# Patient Record
Sex: MALE | Race: NATIVE HAWAIIAN OR OTHER PACIFIC ISLANDER | NOT HISPANIC OR LATINO | URBAN - METROPOLITAN AREA
[De-identification: names, ages, dates, MRNs, and addresses within clinical notes are randomized per-mention and may not be internally consistent; named-entity substitution may affect disease eponyms.]

---

## 2017-08-01 ENCOUNTER — INPATIENT (INPATIENT)
Facility: HOSPITAL | Age: 72
LOS: 1 days | Discharge: HOME | DRG: 292 | End: 2017-08-03
Attending: INTERNAL MEDICINE | Admitting: INTERNAL MEDICINE
Payer: MEDICARE

## 2017-08-01 VITALS
OXYGEN SATURATION: 100 % | DIASTOLIC BLOOD PRESSURE: 70 MMHG | TEMPERATURE: 98 F | HEART RATE: 67 BPM | RESPIRATION RATE: 16 BRPM | SYSTOLIC BLOOD PRESSURE: 134 MMHG

## 2017-08-01 LAB
ALBUMIN SERPL ELPH-MCNC: 3.5 G/DL — SIGNIFICANT CHANGE UP (ref 3.3–5)
ALBUMIN SERPL ELPH-MCNC: 3.7 G/DL — SIGNIFICANT CHANGE UP (ref 3.3–5)
ALP SERPL-CCNC: 109 U/L — SIGNIFICANT CHANGE UP (ref 40–120)
ALP SERPL-CCNC: 120 U/L — SIGNIFICANT CHANGE UP (ref 40–120)
ALT FLD-CCNC: 33 U/L — SIGNIFICANT CHANGE UP (ref 10–45)
ALT FLD-CCNC: <5 U/L — LOW (ref 10–45)
ANION GAP SERPL CALC-SCNC: 11 MMOL/L — SIGNIFICANT CHANGE UP (ref 5–17)
ANION GAP SERPL CALC-SCNC: 12 MMOL/L — SIGNIFICANT CHANGE UP (ref 5–17)
APTT BLD: 32.8 SEC — SIGNIFICANT CHANGE UP (ref 27.5–37.4)
AST SERPL-CCNC: 34 U/L — SIGNIFICANT CHANGE UP (ref 10–40)
AST SERPL-CCNC: 62 U/L — HIGH (ref 10–40)
BASOPHILS NFR BLD AUTO: 0.7 % — SIGNIFICANT CHANGE UP (ref 0–2)
BILIRUB SERPL-MCNC: 0.2 MG/DL — SIGNIFICANT CHANGE UP (ref 0.2–1.2)
BILIRUB SERPL-MCNC: 0.3 MG/DL — SIGNIFICANT CHANGE UP (ref 0.2–1.2)
BUN SERPL-MCNC: 41 MG/DL — HIGH (ref 7–23)
BUN SERPL-MCNC: 42 MG/DL — HIGH (ref 7–23)
CALCIUM SERPL-MCNC: 8.5 MG/DL — SIGNIFICANT CHANGE UP (ref 8.4–10.5)
CALCIUM SERPL-MCNC: 8.7 MG/DL — SIGNIFICANT CHANGE UP (ref 8.4–10.5)
CHLORIDE SERPL-SCNC: 100 MMOL/L — SIGNIFICANT CHANGE UP (ref 96–108)
CHLORIDE SERPL-SCNC: 103 MMOL/L — SIGNIFICANT CHANGE UP (ref 96–108)
CK MB CFR SERPL CALC: 1.8 NG/ML — SIGNIFICANT CHANGE UP (ref 0–6.7)
CK SERPL-CCNC: 158 U/L — SIGNIFICANT CHANGE UP (ref 30–200)
CO2 SERPL-SCNC: 24 MMOL/L — SIGNIFICANT CHANGE UP (ref 22–31)
CO2 SERPL-SCNC: 24 MMOL/L — SIGNIFICANT CHANGE UP (ref 22–31)
CREAT SERPL-MCNC: 2.3 MG/DL — HIGH (ref 0.5–1.3)
CREAT SERPL-MCNC: 2.3 MG/DL — HIGH (ref 0.5–1.3)
EOSINOPHIL NFR BLD AUTO: 4.4 % — SIGNIFICANT CHANGE UP (ref 0–6)
GLUCOSE SERPL-MCNC: 134 MG/DL — HIGH (ref 70–99)
GLUCOSE SERPL-MCNC: 94 MG/DL — SIGNIFICANT CHANGE UP (ref 70–99)
HCT VFR BLD CALC: 33.5 % — LOW (ref 39–50)
HGB BLD-MCNC: 10.8 G/DL — LOW (ref 13–17)
INR BLD: 1.05 — SIGNIFICANT CHANGE UP (ref 0.88–1.16)
LYMPHOCYTES # BLD AUTO: 22.8 % — SIGNIFICANT CHANGE UP (ref 13–44)
MCHC RBC-ENTMCNC: 28.3 PG — SIGNIFICANT CHANGE UP (ref 27–34)
MCHC RBC-ENTMCNC: 32.2 G/DL — SIGNIFICANT CHANGE UP (ref 32–36)
MCV RBC AUTO: 87.9 FL — SIGNIFICANT CHANGE UP (ref 80–100)
MONOCYTES NFR BLD AUTO: 18.6 % — HIGH (ref 2–14)
NEUTROPHILS NFR BLD AUTO: 53.5 % — SIGNIFICANT CHANGE UP (ref 43–77)
PLATELET # BLD AUTO: 202 K/UL — SIGNIFICANT CHANGE UP (ref 150–400)
POTASSIUM SERPL-MCNC: 4.3 MMOL/L — SIGNIFICANT CHANGE UP (ref 3.5–5.3)
POTASSIUM SERPL-MCNC: SIGNIFICANT CHANGE UP MMOL/L (ref 3.5–5.3)
POTASSIUM SERPL-SCNC: 4.3 MMOL/L — SIGNIFICANT CHANGE UP (ref 3.5–5.3)
POTASSIUM SERPL-SCNC: SIGNIFICANT CHANGE UP MMOL/L (ref 3.5–5.3)
PROT SERPL-MCNC: 7.3 G/DL — SIGNIFICANT CHANGE UP (ref 6–8.3)
PROT SERPL-MCNC: 8.4 G/DL — HIGH (ref 6–8.3)
PROTHROM AB SERPL-ACNC: 11.7 SEC — SIGNIFICANT CHANGE UP (ref 9.8–12.7)
RBC # BLD: 3.81 M/UL — LOW (ref 4.2–5.8)
RBC # FLD: 15.8 % — SIGNIFICANT CHANGE UP (ref 10.3–16.9)
SODIUM SERPL-SCNC: 135 MMOL/L — SIGNIFICANT CHANGE UP (ref 135–145)
SODIUM SERPL-SCNC: 139 MMOL/L — SIGNIFICANT CHANGE UP (ref 135–145)
TROPONIN T SERPL-MCNC: <0.01 NG/ML — SIGNIFICANT CHANGE UP (ref 0–0.01)
WBC # BLD: 4.6 K/UL — SIGNIFICANT CHANGE UP (ref 3.8–10.5)
WBC # FLD AUTO: 4.6 K/UL — SIGNIFICANT CHANGE UP (ref 3.8–10.5)

## 2017-08-01 PROCEDURE — 71010: CPT | Mod: 26

## 2017-08-01 PROCEDURE — 99285 EMERGENCY DEPT VISIT HI MDM: CPT

## 2017-08-01 RX ORDER — FUROSEMIDE 40 MG
40 TABLET ORAL DAILY
Qty: 0 | Refills: 0 | Status: DISCONTINUED | OUTPATIENT
Start: 2017-08-01 | End: 2017-08-03

## 2017-08-01 RX ORDER — ISOSORBIDE DINITRATE 5 MG/1
20 TABLET ORAL EVERY 8 HOURS
Qty: 0 | Refills: 0 | Status: DISCONTINUED | OUTPATIENT
Start: 2017-08-01 | End: 2017-08-03

## 2017-08-01 RX ORDER — CLOPIDOGREL BISULFATE 75 MG/1
600 TABLET, FILM COATED ORAL ONCE
Qty: 0 | Refills: 0 | Status: COMPLETED | OUTPATIENT
Start: 2017-08-01 | End: 2017-08-01

## 2017-08-01 RX ORDER — ATORVASTATIN CALCIUM 80 MG/1
80 TABLET, FILM COATED ORAL AT BEDTIME
Qty: 0 | Refills: 0 | Status: DISCONTINUED | OUTPATIENT
Start: 2017-08-01 | End: 2017-08-03

## 2017-08-01 RX ORDER — HYDRALAZINE HCL 50 MG
20 TABLET ORAL EVERY 8 HOURS
Qty: 0 | Refills: 0 | Status: DISCONTINUED | OUTPATIENT
Start: 2017-08-01 | End: 2017-08-03

## 2017-08-01 RX ORDER — LISINOPRIL 2.5 MG/1
20 TABLET ORAL DAILY
Qty: 0 | Refills: 0 | Status: DISCONTINUED | OUTPATIENT
Start: 2017-08-01 | End: 2017-08-02

## 2017-08-01 RX ORDER — CARVEDILOL PHOSPHATE 80 MG/1
6.25 CAPSULE, EXTENDED RELEASE ORAL EVERY 12 HOURS
Qty: 0 | Refills: 0 | Status: DISCONTINUED | OUTPATIENT
Start: 2017-08-01 | End: 2017-08-03

## 2017-08-01 RX ORDER — NITROGLYCERIN 6.5 MG
0.4 CAPSULE, EXTENDED RELEASE ORAL
Qty: 0 | Refills: 0 | Status: DISCONTINUED | OUTPATIENT
Start: 2017-08-01 | End: 2017-08-01

## 2017-08-01 RX ORDER — ASPIRIN/CALCIUM CARB/MAGNESIUM 324 MG
81 TABLET ORAL DAILY
Qty: 0 | Refills: 0 | Status: DISCONTINUED | OUTPATIENT
Start: 2017-08-01 | End: 2017-08-03

## 2017-08-01 RX ORDER — SPIRONOLACTONE 25 MG/1
25 TABLET, FILM COATED ORAL DAILY
Qty: 0 | Refills: 0 | Status: DISCONTINUED | OUTPATIENT
Start: 2017-08-01 | End: 2017-08-03

## 2017-08-01 RX ADMIN — ATORVASTATIN CALCIUM 80 MILLIGRAM(S): 80 TABLET, FILM COATED ORAL at 23:09

## 2017-08-01 RX ADMIN — Medication 81 MILLIGRAM(S): at 20:12

## 2017-08-01 RX ADMIN — Medication 0.4 MILLIGRAM(S): at 20:12

## 2017-08-01 RX ADMIN — CLOPIDOGREL BISULFATE 600 MILLIGRAM(S): 75 TABLET, FILM COATED ORAL at 23:08

## 2017-08-01 NOTE — H&P ADULT - PROBLEM SELECTOR PLAN 3
Thrombus reported on patient's discharge record from [facility] 6/4/17 but pt denies. Was discharged with therapeutic INR and amio which pt reports was stopped one week after discharge - will need collateral info from [facility] and repeat echo ordered for am. Will hold anticoagulation pending results and plan.

## 2017-08-01 NOTE — ED PROVIDER NOTE - OBJECTIVE STATEMENT
72 yo M w/ h/o COPD, CHF and a pacemaker, c/o central chest pain. Pt was sitting earlier today when he stood up and began to feel weak, dizzy, and faint. Pt began walking and the chest pain began as pressure on his chest w/ 8/10 pain. EMS gave the pt 1 nitro and ASA, after which the pain subsided to 4/10, it still persists. Pt notes that he has never experienced chest pain like this before. Pt also c/o SOB. Pt's PCP is Dr. Gonzalez and his cardiologist Dr. Dyer. 70 yo M w/ h/o COPD, CHF, cardiomyopathy w/AICD, HTN, HLD, ?former smoker, p/w central chest pain. Pt was sitting earlier today when he stood up and began to feel weak, dizzy, and faint. Pt began walking and the chest pain began as pressure on his chest w/ 8/10 pain. EMS gave the pt 1 nitro and ASA, after which the pain subsided to 4/10, it still persists. Pt notes that he has never experienced chest pain like this before. Pt also c/o SOB. Pt's PCP is Dr. Gonzalez and his cardiologist Dr. Dyer.

## 2017-08-01 NOTE — H&P ADULT - NSHPLABSRESULTS_GEN_ALL_CORE
PT/INR - ( 01 Aug 2017 19:28 )   PT: 11.7 sec;   INR: 1.05     PTT - ( 01 Aug 2017 19:28 )  PTT:32.8 sec  LIVER FUNCTIONS - ( 01 Aug 2017 21:31 )  Alb: 3.5 g/dL / Pro: 7.3 g/dL / ALK PHOS: 120 U/L / ALT: 33 U/L / AST: 34 U/L / GGT: x           CBC Full  -  ( 01 Aug 2017 19:28 )  WBC Count : 4.6 K/uL  Hemoglobin : 10.8 g/dL  Hematocrit : 33.5 %  Platelet Count - Automated : 202 K/uL  Mean Cell Volume : 87.9 fL  Mean Cell Hemoglobin : 28.3 pg  Mean Cell Hemoglobin Concentration : 32.2 g/dL  Auto Neutrophil # : x  Auto Lymphocyte # : x  Auto Monocyte # : x  Auto Eosinophil # : x  Auto Basophil # : x  Auto Neutrophil % : 53.5 %  Auto Lymphocyte % : 22.8 %  Auto Monocyte % : 18.6 %  Auto Eosinophil % : 4.4 %  Auto Basophil % : 0.7 %    CARDIAC MARKERS ( 01 Aug 2017 19:28 )  x     / <0.01 ng/mL / 158 U/L / x     / 1.8 ng/mL    01 Aug 2017 21:31    139    |  103    |  42     ----------------------------<  134    4.3     |  24     |  2.30     Ca    8.5        01 Aug 2017 21:31    TPro  7.3    /  Alb  3.5    /  TBili  0.2    /  DBili  x      /  AST  34     /  ALT  33     /  AlkPhos  120    01 Aug 2017 21:31

## 2017-08-01 NOTE — ED PROVIDER NOTE - CARE PLAN
Principal Discharge DX:	Angina at rest  Secondary Diagnosis:	Chronic congestive heart failure, unspecified congestive heart failure type  Secondary Diagnosis:	AICD (automatic cardioverter/defibrillator) present

## 2017-08-01 NOTE — H&P ADULT - PROBLEM SELECTOR PLAN 1
Chronic systolic CHF without acute volume overload.  Will continue daily lasix and spironolactone, daily weights and I/Os, hold Lisinopril in setting of acute on chronic stage 3 CKD (Cr was 1.6 on 6/4/17). Social work consult for possible placement.

## 2017-08-01 NOTE — ED PROVIDER NOTE - MEDICAL DECISION MAKING DETAILS
+ high risk CP w/many cardiac risk factors, negative trop, will admit for ECHO and possible cath. Discussed with overnight Cardiologist, agrees with admission. ASA, Nitro given. Plavix loading dose ordered.

## 2017-08-01 NOTE — H&P ADULT - NSHPPHYSICALEXAM_GEN_ALL_CORE
Vital Signs Last 24 Hrs  T(C): 36.8 (01 Aug 2017 21:49), Max: 36.8 (01 Aug 2017 21:49)  T(F): 98.2 (01 Aug 2017 21:49), Max: 98.2 (01 Aug 2017 21:49)  HR: 64 (01 Aug 2017 23:43) (64 - 67)  BP: 154/72 (01 Aug 2017 23:43) (127/61 - 154/72)  RR: 16 (01 Aug 2017 23:43) (16 - 17)  SpO2: 96% (01 Aug 2017 23:43) (96% - 100%)    Constitutional: WDWN male sitting up in bed, NAD    EENMT: NCAT, EOMI    Neck: supple, no JVD, no bruit bilat    Respiratory: scant crackles right base, otherwise CTA    Cardiovascular: RRR no m/r/g    Gastrointestinal:soft, ntnd,nabsx4    Genitourinary:deferred    Rectal:deferred    Extremities: without c/c/e    Vascular: Faint DP/PT bilat, warm, dry without lesions    Neurological: aaox3 no focal deficit    Skin: without rash or lesion    Musculoskeletal: without defect    Psychiatric: appropriate Vital Signs Last 24 Hrs  T(C): 36.8 (01 Aug 2017 21:49), Max: 36.8 (01 Aug 2017 21:49)  T(F): 98.2 (01 Aug 2017 21:49), Max: 98.2 (01 Aug 2017 21:49)  HR: 64 (01 Aug 2017 23:43) (64 - 67)  BP: 154/72 (01 Aug 2017 23:43) (127/61 - 154/72)  RR: 16 (01 Aug 2017 23:43) (16 - 17)  SpO2: 96% (01 Aug 2017 23:43) (96% - 100%)    Constitutional: WDWN male sitting up in bed, NAD    EENMT: NCAT, EOMI    Neck: supple, no JVD, no bruit bilat    Respiratory: scant crackles right base, otherwise CTA    Cardiovascular: RRR no m/r/g. left chest ICD pocket c/d/i    Gastrointestinal:soft, ntnd,nabsx4    Genitourinary:deferred    Rectal:deferred    Extremities: without c/c/e    Vascular: Faint DP/PT bilat, warm, dry without lesions    Neurological: aaox3 no focal deficit    Skin: without rash or lesion    Musculoskeletal: without defect    Psychiatric: appropriate

## 2017-08-01 NOTE — H&P ADULT - NSHPSOCIALHISTORY_GEN_ALL_CORE
Former 35 pack year tobacco quit 2015  Occasional glass of wine  Remote h/o marijuana use 1960s  Lives alone in 5th floor walk up

## 2017-08-01 NOTE — ED ADULT NURSE NOTE - OBJECTIVE STATEMENT
70 y/o male c/o sudden onset chest pain, SOB 72 y/o male c/o sudden onset chest pain, SOB. was seen at [facility] last week and admitted for pleural effusion. pt has pacemaker/defibrillator. denies any headache, dizziness, fever/chills, numbness/tingling, syncope. no acute distress, VS stable, EKG done and pt placed on continuous cardiac monitor.

## 2017-08-01 NOTE — H&P ADULT - PMH
CHF (congestive heart failure)    Chronic kidney disease (CKD), stage III (moderate)    COPD (chronic obstructive pulmonary disease)    HLD (hyperlipidemia)    HTN (hypertension)    ICD (implantable cardioverter-defibrillator) in place    Left ventricular mural thrombus without MI    SVT (supraventricular tachycardia)

## 2017-08-01 NOTE — ED ADULT NURSE NOTE - PMH
CHF (congestive heart failure) CHF (congestive heart failure)    COPD (chronic obstructive pulmonary disease)    HLD (hyperlipidemia)    HTN (hypertension)

## 2017-08-01 NOTE — ED PROVIDER NOTE - SECONDARY DIAGNOSIS.
Chronic congestive heart failure, unspecified congestive heart failure type AICD (automatic cardioverter/defibrillator) present

## 2017-08-01 NOTE — H&P ADULT - PROBLEM SELECTOR PLAN 2
Increase isosorbide DN to three times daily and control BP.  Serial enzymes. Continue ASA 81mg daily.

## 2017-08-01 NOTE — H&P ADULT - HISTORY OF PRESENT ILLNESS
70yo M h/o HTN, HLD, chronic systolic CHF, cardiomyopathy with Bi-V ICD, ?LV mural thrombus (pt denies) and CKD stage 3.  Pt with multiple hospitalizations over the past several months for CHF exacerbation ([facility] in June, [facility] last week) and possible LV thrombus, possible VFib (pt states he was told to stop amiodarone and warfarin one week after discharge in June). He presents to [facility] ED today c/o chest tightness with MUSTAFA of sudden onset yesterday.  He states his exercise tolerance is improved to 2 blocks since most recent hospitalization, but was told to seek medical attention for any recurring symptoms.  He reports his lower extremity edema has resolved but orthopnea has continued.  He reports to be mostly compliant with medications, although takes Hydralazine and Isosorbide DN twice daily instead of the prescribed three times daily. He is not compliant with salt restriction or limited fluid intake. He reports bilateral LE claudication and intermittent lightheadedness/near-syncope but denies ICD firing.    In the ED, EKG is V-paced, troponin is negative, CXR without I/E.  He is now admitted for tele monitoring, will need to obtain collateral info from [facility] re: indication for amio/coumadin 2months ago.    CXR at [facility] 7/23/17: Bi-V ICD in place, enlarged cardiomediastinal silhouette. Aortic tortuosity/calcification. Trace pleural effusion and minimal right basilar atelectasis.

## 2017-08-01 NOTE — H&P ADULT - NSHPREVIEWOFSYSTEMS_GEN_ALL_CORE
General:	+fatigue    Skin/Breast:denies  	  Ophthalmologic:denies  	  ENMT:	denies    Respiratory and Thorax: MUSTAFA after 2 blocks, +orthopnea  	  Cardiovascular:	chest tightness x 2 days; bilateral LE claudication    Gastrointestinal:	denies    Genitourinary:	denies    Musculoskeletal:	denies    Neurological:	h/o near-syncope    Psychiatric:	denies    Hematology/Lymphatics:	denies    Endocrine:	denies    Allergic/Immunologic:	denies

## 2017-08-01 NOTE — H&P ADULT - ATTENDING COMMENTS
PATIENT SEEN AND EXAMINED.  AGREE WITH PLAN.  CONFIRMED WITH PATIENT.    CHF FORMAL EVALUATION PENDING.  DISCUSSED WITH PRIMARY CARDIO - HE WILL SEE HIM TOMORROW IN THE OFFICE AND CONSIDER MEMS IMPLANT FOR RECURRENT CHF EXAC.  ICD - NO VF/VT/AFIB

## 2017-08-01 NOTE — ED ADULT TRIAGE NOTE - CHIEF COMPLAINT QUOTE
Pt BIBA c/o chest pain, SOB that worsened today. PT given ASA 162mg PO and Nitro 0.4 SL with partial relief.

## 2017-08-01 NOTE — H&P ADULT - ASSESSMENT
This is a 70yo M h/o HTN, HLD, CHF, ICD, CKD stage 3, ?LV mural thrombus recently on warfarin presents to ED with an episode of chest tightness with MUSTAFA yesterday. Pt without clinical evidence of volume overload, symptoms possibly related to decreased use of Isosorbide DN and hydralazine. Recently started on amio and warfarin which pt reports was stopped one week after discharge in June - will need collateral info from [facility].  Will admit for tele monitoring, serial enzymes, repeat echo and management of what appears to be chronic stable angina.  Will obtain social work consult for possible rehab/SNF placement as patient reports he cannot climb the 5 flights of stairs to his apartment.

## 2017-08-01 NOTE — H&P ADULT - PROBLEM SELECTOR PLAN 4
Cr 2.3 on admission from 1.6 on 6/4/17.  Will contiue diuretics given h/o CHF but will hold ACEI at this time.  Continue to trend. Avoid nephrotoxic agents.

## 2017-08-02 DIAGNOSIS — I50.9 HEART FAILURE, UNSPECIFIED: ICD-10-CM

## 2017-08-02 DIAGNOSIS — I20.8 OTHER FORMS OF ANGINA PECTORIS: ICD-10-CM

## 2017-08-02 DIAGNOSIS — I10 ESSENTIAL (PRIMARY) HYPERTENSION: ICD-10-CM

## 2017-08-02 DIAGNOSIS — I51.3 INTRACARDIAC THROMBOSIS, NOT ELSEWHERE CLASSIFIED: ICD-10-CM

## 2017-08-02 DIAGNOSIS — E78.5 HYPERLIPIDEMIA, UNSPECIFIED: ICD-10-CM

## 2017-08-02 DIAGNOSIS — Z96.649 PRESENCE OF UNSPECIFIED ARTIFICIAL HIP JOINT: Chronic | ICD-10-CM

## 2017-08-02 DIAGNOSIS — N18.3 CHRONIC KIDNEY DISEASE, STAGE 3 (MODERATE): ICD-10-CM

## 2017-08-02 LAB
ANION GAP SERPL CALC-SCNC: 12 MMOL/L — SIGNIFICANT CHANGE UP (ref 5–17)
APPEARANCE UR: CLEAR — SIGNIFICANT CHANGE UP
APTT BLD: 32.1 SEC — SIGNIFICANT CHANGE UP (ref 27.5–37.4)
BILIRUB UR-MCNC: NEGATIVE — SIGNIFICANT CHANGE UP
BUN SERPL-MCNC: 41 MG/DL — HIGH (ref 7–23)
CALCIUM SERPL-MCNC: 8.7 MG/DL — SIGNIFICANT CHANGE UP (ref 8.4–10.5)
CHLORIDE SERPL-SCNC: 104 MMOL/L — SIGNIFICANT CHANGE UP (ref 96–108)
CHOLEST SERPL-MCNC: 138 MG/DL — SIGNIFICANT CHANGE UP (ref 10–199)
CO2 SERPL-SCNC: 24 MMOL/L — SIGNIFICANT CHANGE UP (ref 22–31)
COLOR SPEC: YELLOW — SIGNIFICANT CHANGE UP
CREAT SERPL-MCNC: 2 MG/DL — HIGH (ref 0.5–1.3)
DIFF PNL FLD: NEGATIVE — SIGNIFICANT CHANGE UP
GLUCOSE SERPL-MCNC: 84 MG/DL — SIGNIFICANT CHANGE UP (ref 70–99)
GLUCOSE UR QL: NEGATIVE — SIGNIFICANT CHANGE UP
HBA1C BLD-MCNC: 5.2 % — SIGNIFICANT CHANGE UP (ref 4–5.6)
HCT VFR BLD CALC: 29.5 % — LOW (ref 39–50)
HDLC SERPL-MCNC: 48 MG/DL — SIGNIFICANT CHANGE UP (ref 40–125)
HGB BLD-MCNC: 9.8 G/DL — LOW (ref 13–17)
INR BLD: 1.05 — SIGNIFICANT CHANGE UP (ref 0.88–1.16)
KETONES UR-MCNC: NEGATIVE — SIGNIFICANT CHANGE UP
LEUKOCYTE ESTERASE UR-ACNC: NEGATIVE — SIGNIFICANT CHANGE UP
LIPID PNL WITH DIRECT LDL SERPL: 77 MG/DL — SIGNIFICANT CHANGE UP
MAGNESIUM SERPL-MCNC: 2.6 MG/DL — SIGNIFICANT CHANGE UP (ref 1.6–2.6)
MCHC RBC-ENTMCNC: 28.6 PG — SIGNIFICANT CHANGE UP (ref 27–34)
MCHC RBC-ENTMCNC: 33.2 G/DL — SIGNIFICANT CHANGE UP (ref 32–36)
MCV RBC AUTO: 86 FL — SIGNIFICANT CHANGE UP (ref 80–100)
NITRITE UR-MCNC: NEGATIVE — SIGNIFICANT CHANGE UP
NT-PROBNP SERPL-SCNC: 650 PG/ML — HIGH (ref 0–300)
NT-PROBNP SERPL-SCNC: 668 PG/ML — HIGH (ref 0–300)
PH UR: 5.5 — SIGNIFICANT CHANGE UP (ref 5–8)
PHOSPHATE SERPL-MCNC: 4.6 MG/DL — HIGH (ref 2.5–4.5)
PLATELET # BLD AUTO: 184 K/UL — SIGNIFICANT CHANGE UP (ref 150–400)
POTASSIUM SERPL-MCNC: 4.6 MMOL/L — SIGNIFICANT CHANGE UP (ref 3.5–5.3)
POTASSIUM SERPL-SCNC: 4.6 MMOL/L — SIGNIFICANT CHANGE UP (ref 3.5–5.3)
PROT UR-MCNC: NEGATIVE MG/DL — SIGNIFICANT CHANGE UP
PROTHROM AB SERPL-ACNC: 11.7 SEC — SIGNIFICANT CHANGE UP (ref 9.8–12.7)
RBC # BLD: 3.43 M/UL — LOW (ref 4.2–5.8)
RBC # FLD: 16 % — SIGNIFICANT CHANGE UP (ref 10.3–16.9)
SODIUM SERPL-SCNC: 140 MMOL/L — SIGNIFICANT CHANGE UP (ref 135–145)
SP GR SPEC: 1.01 — SIGNIFICANT CHANGE UP (ref 1–1.03)
TOTAL CHOLESTEROL/HDL RATIO MEASUREMENT: 2.9 RATIO — LOW (ref 3.4–9.6)
TRIGL SERPL-MCNC: 65 MG/DL — SIGNIFICANT CHANGE UP (ref 10–149)
TROPONIN T SERPL-MCNC: <0.01 NG/ML — SIGNIFICANT CHANGE UP (ref 0–0.01)
UROBILINOGEN FLD QL: 0.2 E.U./DL — SIGNIFICANT CHANGE UP
WBC # BLD: 4.4 K/UL — SIGNIFICANT CHANGE UP (ref 3.8–10.5)
WBC # FLD AUTO: 4.4 K/UL — SIGNIFICANT CHANGE UP (ref 3.8–10.5)

## 2017-08-02 PROCEDURE — 93306 TTE W/DOPPLER COMPLETE: CPT | Mod: 26

## 2017-08-02 PROCEDURE — 99223 1ST HOSP IP/OBS HIGH 75: CPT

## 2017-08-02 RX ORDER — CLOPIDOGREL BISULFATE 75 MG/1
75 TABLET, FILM COATED ORAL DAILY
Qty: 0 | Refills: 0 | Status: DISCONTINUED | OUTPATIENT
Start: 2017-08-02 | End: 2017-08-02

## 2017-08-02 RX ORDER — HEPARIN SODIUM 5000 [USP'U]/ML
5000 INJECTION INTRAVENOUS; SUBCUTANEOUS EVERY 8 HOURS
Qty: 0 | Refills: 0 | Status: DISCONTINUED | OUTPATIENT
Start: 2017-08-02 | End: 2017-08-03

## 2017-08-02 RX ORDER — PANTOPRAZOLE SODIUM 20 MG/1
40 TABLET, DELAYED RELEASE ORAL
Qty: 0 | Refills: 0 | Status: DISCONTINUED | OUTPATIENT
Start: 2017-08-02 | End: 2017-08-03

## 2017-08-02 RX ADMIN — HEPARIN SODIUM 5000 UNIT(S): 5000 INJECTION INTRAVENOUS; SUBCUTANEOUS at 22:04

## 2017-08-02 RX ADMIN — CARVEDILOL PHOSPHATE 6.25 MILLIGRAM(S): 80 CAPSULE, EXTENDED RELEASE ORAL at 17:58

## 2017-08-02 RX ADMIN — Medication 81 MILLIGRAM(S): at 12:22

## 2017-08-02 RX ADMIN — Medication 20 MILLIGRAM(S): at 05:33

## 2017-08-02 RX ADMIN — Medication 40 MILLIGRAM(S): at 05:33

## 2017-08-02 RX ADMIN — Medication 20 MILLIGRAM(S): at 13:34

## 2017-08-02 RX ADMIN — ISOSORBIDE DINITRATE 20 MILLIGRAM(S): 5 TABLET ORAL at 05:31

## 2017-08-02 RX ADMIN — Medication 20 MILLIGRAM(S): at 22:04

## 2017-08-02 RX ADMIN — CARVEDILOL PHOSPHATE 6.25 MILLIGRAM(S): 80 CAPSULE, EXTENDED RELEASE ORAL at 05:34

## 2017-08-02 RX ADMIN — ATORVASTATIN CALCIUM 80 MILLIGRAM(S): 80 TABLET, FILM COATED ORAL at 22:05

## 2017-08-02 RX ADMIN — ISOSORBIDE DINITRATE 20 MILLIGRAM(S): 5 TABLET ORAL at 13:34

## 2017-08-02 RX ADMIN — SPIRONOLACTONE 25 MILLIGRAM(S): 25 TABLET, FILM COATED ORAL at 05:32

## 2017-08-02 RX ADMIN — CLOPIDOGREL BISULFATE 75 MILLIGRAM(S): 75 TABLET, FILM COATED ORAL at 12:22

## 2017-08-02 RX ADMIN — ISOSORBIDE DINITRATE 20 MILLIGRAM(S): 5 TABLET ORAL at 22:04

## 2017-08-02 NOTE — DISCHARGE NOTE ADULT - MEDICATION SUMMARY - MEDICATIONS TO TAKE
I will START or STAY ON the medications listed below when I get home from the hospital:    spironolactone 25 mg oral tablet  -- 1 tab(s) by mouth once a day  -- Indication: For CHF (congestive heart failure)/ water pill    aspirin 81 mg oral tablet, chewable  -- 1 tab(s) by mouth once a day  -- Indication: For Heart health    isosorbide dinitrate 20 mg oral tablet  -- 1 tab(s) by mouth every 8 hours  -- Indication: For CHest pain    atorvastatin 20 mg oral tablet  -- 1 tab(s) by mouth once a day  -- Avoid grapefruit and grapefruit juice while taking this medication.  Do not take this drug if you are pregnant.  It is very important that you take or use this exactly as directed.  Do not skip doses or discontinue unless directed by your doctor.  Obtain medical advice before taking any non-prescription drugs as some may affect the action of this medication.  Take with food or milk.    -- Indication: For CHolesterol control    carvedilol 6.25 mg oral tablet  -- 1 tab(s) by mouth 2 times a day  -- Indication: For Blood pressure control/ Congestive heart failure     furosemide 40 mg oral tablet  -- 1 tab(s) by mouth once a day  -- Indication: For CHF (congestive heart failure)/ water pill    hydrALAZINE 10 mg oral tablet  -- 2 tab(s) by mouth every 8 hours  -- Indication: For CHF (congestive heart failure)/ helping your heart pump more strongly

## 2017-08-02 NOTE — DISCHARGE NOTE ADULT - CARE PROVIDER_API CALL
Sam Ocasio  CARDIOLOGIST  Phone: [phone]  Fax: (   )    -    Ga Frazier  PRIMARY CARE DOCTOR  Phone: [phone]  Fax: (   )    -

## 2017-08-02 NOTE — PROGRESS NOTE ADULT - SUBJECTIVE AND OBJECTIVE BOX
EPS Device interrogation    Indication: SOB    Device model: 	Ronald		Implanting Physician: Dr. Amor    Functioning Mode: DDD/BiV			    Underlying Rhythm: AS/BiV P    Pacemaker dependency:  No    Battery status: 2.84 V ( MOS2)  Interrogating parameters:   				RA			RV			LV    Sense:                                         3.3mV                         24.4  mV                     15.1 mV    Threshold:                                       0.7 V @ 0.5 ms           1.0 V@0.5 ms          1.6 V @ 0.5 ms    Pacing Impedance:                                   565om                         478om              579  om    Shock Impedance:                                                                       57 om    Events/Alert:  no episodes since 5/17/17    Parameter change: 	none    For ICD only:  tachy therapy – unchanged   Normal function ICD      [ ]EPS attending: Interrogation reviewed. Agree with above.

## 2017-08-02 NOTE — DISCHARGE NOTE ADULT - HOSPITAL COURSE
72yo M h/o HTN, HLD, chronic systolic CHF, cardiomyopathy with Bi-V ICD, ?LV mural thrombus (pt denies) and CKD stage 3.  Pt with multiple hospitalizations over the past several months for CHF exacerbation ([facility] in June, [facility] last week) and possible LV thrombus, possible VFib (pt states he was told to stop amiodarone and warfarin one week after discharge in June). He presents to [facility] ED today c/o chest tightness with MUSTAFA of sudden onset yesterday.  He states his exercise tolerance is improved to 2 blocks since most recent hospitalization, but was told to seek medical attention for any recurring symptoms.  He reports his lower extremity edema has resolved but orthopnea has continued.  He reports to be mostly compliant with medications, although takes Hydralazine and Isosorbide DN twice daily instead of the prescribed three times daily. He is not compliant with salt restriction or limited fluid intake. He reports bilateral LE claudication and intermittent lightheadedness/near-syncope but denies ICD firing.    In the ED, EKG is V-paced, troponin is negative, CXR without I/E.  He is now admitted for tele monitoring, will need to obtain collateral info from [facility] re: indication for amio/coumadin 2months ago.    CXR at [facility] 7/23/17: Bi-V ICD in place, enlarged cardiomediastinal silhouette. Aortic tortuosity/calcification. Trace pleural effusion and minimal right basilar atelectasis.     Upon admission, patient was continued on home CHF regimen, ACEI held 2/2 CKD. Pt was not clinically hypervolemic and felt fine, VSS. ICD was interrogated by EP which recorded no acute events/arrythmias/vfib/afib. Patient underwent echo with contrast (given h/o ? LV thrombus) There is severe concentric left ventricular hypertrophy. No left ventricular thrombus visualized. There is severe global hypokinesis of the left ventricle. The left ventricular ejection fraction is estimated to be 25-30%The left atrium is mildly dilated. The left atrial volume index is 35 cc/m2 (normal <34cc/m2)The mitral inflow pattern is consistent with impaired left ventricular relaxation with mildly elevated left atrial pressure (8-14mmHg).Right atrial size is normal. There is a pacemaker wire in the right heart. The right ventricle is normal in size and function .Calcified aortic valve. There is mild aortic regurgitation Structurally normal mitral valve. There is trace mitral regurgitation. Structurally normal tricuspid valve. There is trace tricuspid regurgitation. The pulmonary artery systolic pressure is estimated to be 32 mmHg. The pulmonic valve is not well visualized. No aortic root dilatation. The inferior vena cava is normal in size (<2.1 cm) with normal inspiratory collapse (>50%) consistent with normal right atrial pressure. There is no pericardial effusion. Patient's cardiologist outpatient Dr. Ocasio contacted, previous EF reported 2016 as 15%, and prior cath obtained from [facility] 2013 nonobstructive with severe reduction in cardiac index. Patient was seen by heart failure team for a possible consult/follow up for cardio mems device. After discussion with pt's outpatient cardiologist, patient will follow up tomorrow in office on scheduled appt, and Dr. Ocasio will discuss / arrange for cardio mems placement as outpatient. Given Dr. Villarreal's cell # and [facility] cardio number for close follow up.     Today patient seen and examined at bedside, denies   VSS, PE WNL, lungs CTA, no JVD, no LE edema, no events on tele overnight.   Seen and examined by attending and deemed stable for discharge with follow up.   Instructed to return if any worsening of symptoms including not limited to chest pain, shortness of breath, inability to lie flat, increased swelling.   All medications prescribed to pharmacy for  and side effects explained. 72yo M h/o HTN, HLD, chronic systolic CHF, cardiomyopathy with Bi-V ICD, ?LV mural thrombus (pt denies) and CKD stage 3.  Pt with multiple hospitalizations over the past several months for CHF exacerbation ([facility] in June, [facility] last week) and possible LV thrombus, possible VFib (pt states he was told to stop amiodarone and warfarin one week after discharge in June). He presents to [facility] ED today c/o chest tightness with MUSTAFA of sudden onset yesterday.  He states his exercise tolerance is improved to 2 blocks since most recent hospitalization, but was told to seek medical attention for any recurring symptoms.  He reports his lower extremity edema has resolved but orthopnea has continued.  He reports to be mostly compliant with medications, although takes Hydralazine and Isosorbide DN twice daily instead of the prescribed three times daily. He is not compliant with salt restriction or limited fluid intake. He reports bilateral LE claudication and intermittent lightheadedness/near-syncope but denies ICD firing.    In the ED, EKG is V-paced, troponin is negative, CXR without I/E.  He is now admitted for tele monitoring, will need to obtain collateral info from [facility] re: indication for amio/coumadin 2months ago.    CXR at [facility] 7/23/17: Bi-V ICD in place, enlarged cardiomediastinal silhouette. Aortic tortuosity/calcification. Trace pleural effusion and minimal right basilar atelectasis.     Upon admission, patient was continued on home CHF regimen, ACEI held 2/2 CKD. Originally loaded with ASA/Plavix but r/o ACS. Pt was not clinically hypervolemic and felt fine, VSS. ICD was interrogated by EP which recorded no acute events/arrythmias/vfib/afib. Patient underwent echo with contrast (given h/o ? LV thrombus) There is severe concentric left ventricular hypertrophy. No left ventricular thrombus visualized. There is severe global hypokinesis of the left ventricle. The left ventricular ejection fraction is estimated to be 25-30%The left atrium is mildly dilated. The left atrial volume index is 35 cc/m2 (normal <34cc/m2)The mitral inflow pattern is consistent with impaired left ventricular relaxation with mildly elevated left atrial pressure (8-14mmHg).Right atrial size is normal. There is a pacemaker wire in the right heart. The right ventricle is normal in size and function .Calcified aortic valve. There is mild aortic regurgitation Structurally normal mitral valve. There is trace mitral regurgitation. Structurally normal tricuspid valve. There is trace tricuspid regurgitation. The pulmonary artery systolic pressure is estimated to be 32 mmHg. The pulmonic valve is not well visualized. No aortic root dilatation. The inferior vena cava is normal in size (<2.1 cm) with normal inspiratory collapse (>50%) consistent with normal right atrial pressure. There is no pericardial effusion. Patient's cardiologist outpatient Dr. Ocasio contacted, previous EF reported 2016 as 15%, and prior cath obtained from [facility] 2013 nonobstructive with severe reduction in cardiac index. Patient was seen by heart failure team for a possible consult/follow up for cardio mems device. After discussion with pt's outpatient cardiologist, patient will follow up tomorrow in office on scheduled appt, and Dr. Ocasio will discuss / arrange for cardio mems placement as outpatient. Given Dr. Villarreal's cell # and [facility] cardio number for close follow up.     Today patient seen and examined at bedside, denies   VSS, PE WNL, lungs CTA, no JVD, no LE edema, no events on tele overnight.   Seen and examined by attending and deemed stable for discharge with follow up.   Instructed to return if any worsening of symptoms including not limited to chest pain, shortness of breath, inability to lie flat, increased swelling.   All medications prescribed to pharmacy for  and side effects explained. 70yo M h/o HTN, HLD, chronic systolic CHF, cardiomyopathy with Bi-V ICD, ?LV mural thrombus (pt denies) and CKD stage 3.  Pt with multiple hospitalizations over the past several months for CHF exacerbation ([facility] in June, [facility] last week) and possible LV thrombus, possible VFib (pt states he was told to stop amiodarone and warfarin one week after discharge in June). He presents to [facility] ED today c/o chest tightness with MUSTAFA of sudden onset yesterday.  He states his exercise tolerance is improved to 2 blocks since most recent hospitalization, but was told to seek medical attention for any recurring symptoms.  He reports his lower extremity edema has resolved but orthopnea has continued.  He reports to be mostly compliant with medications, although takes Hydralazine and Isosorbide DN twice daily instead of the prescribed three times daily. He is not compliant with salt restriction or limited fluid intake. He reports bilateral LE claudication and intermittent lightheadedness/near-syncope but denies ICD firing. In the ED, EKG is V-paced, troponin is negative, CXR without I/E.  He is now admitted for tele monitoring, will need to obtain collateral info from [facility] re: indication for amio/coumadin 2months ago. CXR at [facility] 7/23/17: Bi-V ICD in place, enlarged cardiomediastinal silhouette. Aortic tortuosity/calcification. Trace pleural effusion and minimal right basilar atelectasis. Upon admission, patient was continued on home CHF regimen, ACEI held 2/2 CKD. Originally loaded with ASA/Plavix but r/o ACS. Pt was not clinically hypervolemic and felt fine, VSS. ICD was interrogated by EP which recorded no acute events/arrythmias/vfib/afib. Patient underwent echo with contrast (given h/o ? LV thrombus) There is severe concentric left ventricular hypertrophy. No left ventricular thrombus visualized. There is severe global hypokinesis of the left ventricle. The left ventricular ejection fraction is estimated to be 25-30%The left atrium is mildly dilated. The left atrial volume index is 35 cc/m2 (normal <34cc/m2)The mitral inflow pattern is consistent with impaired left ventricular relaxation with mildly elevated left atrial pressure (8-14mmHg).Right atrial size is normal. There is a pacemaker wire in the right heart. The right ventricle is normal in size and function .Calcified aortic valve. There is mild aortic regurgitation Structurally normal mitral valve. There is trace mitral regurgitation. Structurally normal tricuspid valve. There is trace tricuspid regurgitation. The pulmonary artery systolic pressure is estimated to be 32 mmHg. The pulmonic valve is not well visualized. No aortic root dilatation. The inferior vena cava is normal in size (<2.1 cm) with normal inspiratory collapse (>50%) consistent with normal right atrial pressure. There is no pericardial effusion. Patient's cardiologist outpatient Dr. Ocasio contacted, previous EF reported 2016 as 15%, and prior cath obtained from [facility] 2013 nonobstructive with severe reduction in cardiac index. Patient was seen by heart failure team for a possible consult/follow up for cardio mems device. After discussion with pt's outpatient cardiologist, patient will follow up tomorrow in office on scheduled appt, and Dr. Ocasio will discuss / arrange for cardio mems placement as outpatient. Given Dr. Villarreal's cell # and [facility] cardio number for close follow up. Today patient seen and examined at bedside, denies VSS, PE WNL, lungs CTA, no JVD, no LE edema, no events on tele overnight. Seen and examined by attending and deemed stable for discharge with follow up. Instructed to return if any worsening of symptoms including not limited to chest pain, shortness of breath, inability to lie flat, increased swelling. All medications prescribed to pharmacy for  and side effects explained.

## 2017-08-02 NOTE — PROGRESS NOTE ADULT - SUBJECTIVE AND OBJECTIVE BOX
Interventional Cardiology PA Adult Progress Note    Subjective Assessment: Pt seen and examined at bedside, states that he felt a little short of breath and felt concerned-- was told to come back to a hospital if symptomatic. Denies chest pain, palpitations, n/v/d, fever, chills, LE edema, orthopnea, PND.   	  MEDICATIONS:  spironolactone 25 milliGRAM(s) Oral daily  isosorbide   dinitrate Tablet (ISORDIL) 20 milliGRAM(s) Oral every 8 hours  carvedilol 6.25 milliGRAM(s) Oral every 12 hours  furosemide    Tablet 40 milliGRAM(s) Oral daily  hydrALAZINE 20 milliGRAM(s) Oral every 8 hours  atorvastatin 80 milliGRAM(s) Oral at bedtime  aspirin  chewable 81 milliGRAM(s) Oral daily  clopidogrel Tablet 75 milliGRAM(s) Oral daily  	    [PHYSICAL EXAM:  TELEMETRY:  T(C): 36.1 (08-02-17 @ 08:38), Max: 36.8 (08-01-17 @ 21:49)  HR: 73 (08-02-17 @ 12:04) (64 - 73)  BP: 133/65 (08-02-17 @ 12:04) (127/61 - 154/72)  RR: 16 (08-02-17 @ 12:04) (15 - 17)  SpO2: 98% (08-02-17 @ 12:04) (96% - 100%)  Wt(kg): --  I&O's Summary    01 Aug 2017 07:01  -  02 Aug 2017 07:00  --------------------------------------------------------  IN: 0 mL / OUT: 200 mL / NET: -200 mL    02 Aug 2017 07:01  -  02 Aug 2017 16:24  --------------------------------------------------------  IN: 0 mL / OUT: 1150 mL / NET: -1150 mL      Height (cm): 175.26 (08-01 @ 23:43)  Weight (kg): 72.8 (08-01 @ 23:43)  BMI (kg/m2): 23.7 (08-01 @ 23:43)  BSA (m2): 1.88 (08-01 @ 23:43)  Adhikari:  Central/PICC/Mid Line:                                         Appearance: Normal	  HEENT:   Normal oral mucosa, PERRL, EOMI	  Neck: Supple, +JVD  Cardiovascular: Normal S1 S2, No murmurs   Respiratory: Lungs clear to auscultation//No Rales, Rhonchi, Wheezing	  Gastrointestinal:  Soft, Non-tender	  Skin: No rashes, No ecchymoses, No cyanosis  Extremities: Normal range of motion, No clubbing, cyanosis or edema  Vascular: Peripheral pulses palpable 2+ bilaterally  Neurologic: Non-focal  Psychiatry: A & O x 3, Mood & affect appropriate      	    ECG:  	  RADIOLOGY:   DIAGNOSTIC TESTING:  [ ] Echocardiogram:  [ ]  Catheterization:  [ ] Stress Test:    [ ] SONALI  OTHER: 	    LABS:	 	  CARDIAC MARKERS:                        9.8    4.4   )-----------( 184      ( 02 Aug 2017 07:01 )             29.5     08-02    140  |  104  |  41<H>  ----------------------------<  84  4.6   |  24  |  2.00<H>    Ca    8.7      02 Aug 2017 06:59  Phos  4.6     08-02  Mg     2.6     08-02    TPro  7.3  /  Alb  3.5  /  TBili  0.2  /  DBili  x   /  AST  34  /  ALT  33  /  AlkPhos  120  08-01    proBNP: Serum Pro-Brain Natriuretic Peptide: 668 pg/mL (08-02 @ 06:59)  Serum Pro-Brain Natriuretic Peptide: 650 pg/mL (08-02 @ 03:15)    Lipid Profile:   HgA1c: Hemoglobin A1C, Whole Blood: 5.2 % (08-02 @ 07:01)    TSH:   PT/INR - ( 02 Aug 2017 07:02 )   PT: 11.7 sec;   INR: 1.05          PTT - ( 02 Aug 2017 07:02 )  PTT:32.1 sec    ASSESSMENT/PLAN: 	        DVT ppx:  Dispo:

## 2017-08-02 NOTE — DISCHARGE NOTE ADULT - PLAN OF CARE
You were admitted to [facility] for management of your chronic systolic heart failure. You were not in an exercbation, however we wanted to make sure that you were not fluid overloaded. Please make sure you continue Lasix 40 mg orally daily, Coreg 6.25 mg orally every 12 hours, hydralazine 20 mg orally every 8 hours, spironolactone 25 mg orally daily, isosorbide dinitrate 20 mg orally every 8 hours. Please weigh yourself daily. Please make sure that you take ALL MEDICATION as directed and do not miss doses. YOU HAVE A SCHEDULED APPOINTMENT TODAY WITH DR. OCASIO AT 11:45 AM. Dr. Ocasio will evaluate you for a device called a Cardio Mems. This device can be put in your pulmonary artery by angiogram and measure your fluid status from home so we can better control your heart failure without you going to the hospital. You have a history of high blood pressure and you must continue medications at home. Please continue Coreg 6.25 mg orally twice daily, Hydralazine 20 mg orally every 8 hours, spironolactone 25 mg orally daily, isosorbide dinitrate 20 mg orally every 8 hours. You have a history of elevated cholesterol, please continue Lipitor 80 mg orally daily for plaque build up prevention in the coronary arteries. You were possibly being treated for a blood clot in the heart. We checked your echocardiogram and there was no evidence of clot. You do not need to be on anticoagulation for that. You should follow up with your cardiologist for a stress test / cardiac catheterization in the near future to assess your coronary arteries. Please continue Aspirin 81 mg orally daily, Plavix 75 mg orally daily. You should follow up with your cardiologist for a stress test / cardiac catheterization in the near future to assess your coronary arteries. Please continue Aspirin 81 mg orally daily.

## 2017-08-02 NOTE — PROGRESS NOTE ADULT - PROBLEM SELECTOR PLAN 4
- CKD stage 3, Cr 2.00. Baseline from otpt labs 1.5-1.6  - continue to monitor closely    GI PPX: protonix  DVT PPX: Hep SC  DISPO: d/c tomorrow before rounds (just med rec needs to be done) so patient can go to outpatient cardiologist appt at 11:45 in [place]. Dr. Villarreal aware and will call in AM. - CKD stage 3, Cr 2.00. Baseline from otpt labs 1.5-1.6  - continue to monitor closely    GI PPX: protonix  DVT PPX: Hep SC  DISPO: d/c tomorrow before rounds (just med rec needs to be done) so patient can go to outpatient cardiologist appt at 11:45 in [place]. Dr. Villarreal aware and will call in AM. Social work aware that patient lives in 5 story walk up apt and has trouble with stairs- has been staying with friends. Follow up recommendations. - CKD stage 3, Cr 2.00. Baseline from otpt labs 1.5-1.6  - holding ACEI, resume otpt  - continue to monitor closely    GI PPX: protonix  DVT PPX: Hep SC  DISPO: d/c tomorrow before rounds (just med rec needs to be done) so patient can go to outpatient cardiologist appt at 11:45 in [place]. Dr. Villarreal aware and will call in AM. Social work aware that patient lives in 5 story walk up apt and has trouble with stairs- has been staying with friends. Follow up recommendations.

## 2017-08-02 NOTE — DISCHARGE NOTE ADULT - ADDITIONAL INSTRUCTIONS
Please follow up with your cardiologist Dr. Ocasio on your scheduled appt today at 1145 AM.  All of your medications were prescribed to your pharmacy, please continue all as directed. Before stopping any medication please consult with your physician.   Please return to the emergency room if worsening of symptoms including not limited to chest pain, shortness of breath, increased swelling in the legs.

## 2017-08-02 NOTE — DISCHARGE NOTE ADULT - INSTRUCTIONS
Please continue a diet low in sodium, cholesterol, and fat. Please also practice a fluid restricted diet.. restrict fluids to 1-2 liters per day. Please weigh yourself daily and keep records. Please notify your heart failure physician/cardiologist if you gain more than 2-3 lbs in 2-3 days as you may require more lasix, or IV medications in the hospital.

## 2017-08-02 NOTE — DISCHARGE NOTE ADULT - CARE PLAN
Principal Discharge DX:	Chronic congestive heart failure, unspecified congestive heart failure type  Goal:	You were admitted to [facility] [place] for management of your chronic systolic heart failure. You were not in an exercbation, however we wanted to make sure that you were not fluid overloaded.  Instructions for follow-up, activity and diet:	Please make sure you continue Lasix 40 mg orally daily, Coreg 6.25 mg orally every 12 hours, hydralazine 20 mg orally every 8 hours, spironolactone 25 mg orally daily, isosorbide dinitrate 20 mg orally every 8 hours. Please weigh yourself daily. Please make sure that you take ALL MEDICATION as directed and do not miss doses. YOU HAVE A SCHEDULED APPOINTMENT TODAY WITH DR. OCASIO AT 11:45 AM. Dr. Ocasio will evaluate you for a device called a Cardio Mems. This device can be put in your pulmonary artery by angiogram and measure your fluid status from home so we can better control your heart failure without you going to the hospital.  Secondary Diagnosis:	HTN (hypertension)  Goal:	You have a history of high blood pressure and you must continue medications at home.  Instructions for follow-up, activity and diet:	Please continue Coreg 6.25 mg orally twice daily, Hydralazine 20 mg orally every 8 hours, spironolactone 25 mg orally daily, isosorbide dinitrate 20 mg orally every 8 hours.  Secondary Diagnosis:	HLD (hyperlipidemia)  Goal:	You have a history of elevated cholesterol, please continue Lipitor 80 mg orally daily for plaque build up prevention in the coronary arteries.  Secondary Diagnosis:	Left ventricular mural thrombus without MI  Goal:	You were possibly being treated for a blood clot in the heart. We checked your echocardiogram and there was no evidence of clot. You do not need to be on anticoagulation for that.  Instructions for follow-up, activity and diet:	You should follow up with your cardiologist for a stress test / cardiac catheterization in the near future to assess your coronary arteries. Please continue Aspirin 81 mg orally daily, Plavix 75 mg orally daily. Principal Discharge DX:	Chronic congestive heart failure, unspecified congestive heart failure type  Goal:	You were admitted to [facility] for management of your chronic systolic heart failure. You were not in an exercbation, however we wanted to make sure that you were not fluid overloaded.  Instructions for follow-up, activity and diet:	Please make sure you continue Lasix 40 mg orally daily, Coreg 6.25 mg orally every 12 hours, hydralazine 20 mg orally every 8 hours, spironolactone 25 mg orally daily, isosorbide dinitrate 20 mg orally every 8 hours. Please weigh yourself daily. Please make sure that you take ALL MEDICATION as directed and do not miss doses. YOU HAVE A SCHEDULED APPOINTMENT TODAY WITH DR. OCASIO AT 11:45 AM. Dr. Ocasio will evaluate you for a device called a Cardio Mems. This device can be put in your pulmonary artery by angiogram and measure your fluid status from home so we can better control your heart failure without you going to the hospital.  Secondary Diagnosis:	HTN (hypertension)  Goal:	You have a history of high blood pressure and you must continue medications at home.  Instructions for follow-up, activity and diet:	Please continue Coreg 6.25 mg orally twice daily, Hydralazine 20 mg orally every 8 hours, spironolactone 25 mg orally daily, isosorbide dinitrate 20 mg orally every 8 hours.  Secondary Diagnosis:	HLD (hyperlipidemia)  Goal:	You have a history of elevated cholesterol, please continue Lipitor 80 mg orally daily for plaque build up prevention in the coronary arteries.  Secondary Diagnosis:	Left ventricular mural thrombus without MI  Goal:	You were possibly being treated for a blood clot in the heart. We checked your echocardiogram and there was no evidence of clot. You do not need to be on anticoagulation for that.  Instructions for follow-up, activity and diet:	You should follow up with your cardiologist for a stress test / cardiac catheterization in the near future to assess your coronary arteries. Please continue Aspirin 81 mg orally daily. Principal Discharge DX:	Chronic congestive heart failure, unspecified congestive heart failure type  Goal:	You were admitted to [facility] for management of your chronic systolic heart failure. You were not in an exercbation, however we wanted to make sure that you were not fluid overloaded.  Instructions for follow-up, activity and diet:	Please make sure you continue Lasix 40 mg orally daily, Coreg 6.25 mg orally every 12 hours, hydralazine 20 mg orally every 8 hours, spironolactone 25 mg orally daily, isosorbide dinitrate 20 mg orally every 8 hours. Please weigh yourself daily. Please make sure that you take ALL MEDICATION as directed and do not miss doses. YOU HAVE A SCHEDULED APPOINTMENT TODAY WITH DR. OCASIO AT 11:45 AM. Dr. Ocasio will evaluate you for a device called a Cardio Mems. This device can be put in your pulmonary artery by angiogram and measure your fluid status from home so we can better control your heart failure without you going to the hospital.  Secondary Diagnosis:	HTN (hypertension)  Goal:	You have a history of high blood pressure and you must continue medications at home.  Instructions for follow-up, activity and diet:	Please continue Coreg 6.25 mg orally twice daily, Hydralazine 20 mg orally every 8 hours, spironolactone 25 mg orally daily, isosorbide dinitrate 20 mg orally every 8 hours.  Secondary Diagnosis:	HLD (hyperlipidemia)  Goal:	You have a history of elevated cholesterol, please continue Lipitor 80 mg orally daily for plaque build up prevention in the coronary arteries.  Secondary Diagnosis:	Left ventricular mural thrombus without MI  Goal:	You were possibly being treated for a blood clot in the heart. We checked your echocardiogram and there was no evidence of clot. You do not need to be on anticoagulation for that.  Instructions for follow-up, activity and diet:	You should follow up with your cardiologist for a stress test / cardiac catheterization in the near future to assess your coronary arteries. Please continue Aspirin 81 mg orally daily. Principal Discharge DX:	Chronic congestive heart failure, unspecified congestive heart failure type  Goal:	You were admitted to [facility] for management of your chronic systolic heart failure. You were not in an exercbation, however we wanted to make sure that you were not fluid overloaded.  Instructions for follow-up, activity and diet:	Please make sure you continue Lasix 40 mg orally daily, Coreg 6.25 mg orally every 12 hours, hydralazine 20 mg orally every 8 hours, spironolactone 25 mg orally daily, isosorbide dinitrate 20 mg orally every 8 hours. Please weigh yourself daily. Please make sure that you take ALL MEDICATION as directed and do not miss doses. YOU HAVE A SCHEDULED APPOINTMENT TODAY WITH DR. OCASIO AT 11:45 AM. Dr. Ocasio will evaluate you for a device called a Cardio Mems. This device can be put in your pulmonary artery by angiogram and measure your fluid status from home so we can better control your heart failure without you going to the hospital.  Secondary Diagnosis:	HTN (hypertension)  Goal:	You have a history of high blood pressure and you must continue medications at home.  Instructions for follow-up, activity and diet:	Please continue Coreg 6.25 mg orally twice daily, Hydralazine 20 mg orally every 8 hours, spironolactone 25 mg orally daily, isosorbide dinitrate 20 mg orally every 8 hours.  Secondary Diagnosis:	HLD (hyperlipidemia)  Goal:	You have a history of elevated cholesterol, please continue Lipitor 80 mg orally daily for plaque build up prevention in the coronary arteries.  Secondary Diagnosis:	Left ventricular mural thrombus without MI  Goal:	You were possibly being treated for a blood clot in the heart. We checked your echocardiogram and there was no evidence of clot. You do not need to be on anticoagulation for that.  Instructions for follow-up, activity and diet:	You should follow up with your cardiologist for a stress test / cardiac catheterization in the near future to assess your coronary arteries. Please continue Aspirin 81 mg orally daily. Principal Discharge DX:	Chronic congestive heart failure, unspecified congestive heart failure type  Goal:	You were admitted to [facility] for management of your chronic systolic heart failure. You were not in an exercbation, however we wanted to make sure that you were not fluid overloaded.  Instructions for follow-up, activity and diet:	Please make sure you continue Lasix 40 mg orally daily, Coreg 6.25 mg orally every 12 hours, hydralazine 20 mg orally every 8 hours, spironolactone 25 mg orally daily, isosorbide dinitrate 20 mg orally every 8 hours. Please weigh yourself daily. Please make sure that you take ALL MEDICATION as directed and do not miss doses. YOU HAVE A SCHEDULED APPOINTMENT TODAY WITH DR. OCASIO AT 11:45 AM. Dr. Ocasio will evaluate you for a device called a Cardio Mems. This device can be put in your pulmonary artery by angiogram and measure your fluid status from home so we can better control your heart failure without you going to the hospital.  Secondary Diagnosis:	HTN (hypertension)  Goal:	You have a history of high blood pressure and you must continue medications at home.  Instructions for follow-up, activity and diet:	Please continue Coreg 6.25 mg orally twice daily, Hydralazine 20 mg orally every 8 hours, spironolactone 25 mg orally daily, isosorbide dinitrate 20 mg orally every 8 hours.  Secondary Diagnosis:	HLD (hyperlipidemia)  Goal:	You have a history of elevated cholesterol, please continue Lipitor 80 mg orally daily for plaque build up prevention in the coronary arteries.  Secondary Diagnosis:	Left ventricular mural thrombus without MI  Goal:	You were possibly being treated for a blood clot in the heart. We checked your echocardiogram and there was no evidence of clot. You do not need to be on anticoagulation for that.  Instructions for follow-up, activity and diet:	You should follow up with your cardiologist for a stress test / cardiac catheterization in the near future to assess your coronary arteries. Please continue Aspirin 81 mg orally daily. Principal Discharge DX:	Chronic congestive heart failure, unspecified congestive heart failure type  Goal:	You were admitted to [facility] for management of your chronic systolic heart failure. You were not in an exercbation, however we wanted to make sure that you were not fluid overloaded.  Instructions for follow-up, activity and diet:	Please make sure you continue Lasix 40 mg orally daily, Coreg 6.25 mg orally every 12 hours, hydralazine 20 mg orally every 8 hours, spironolactone 25 mg orally daily, isosorbide dinitrate 20 mg orally every 8 hours. Please weigh yourself daily. Please make sure that you take ALL MEDICATION as directed and do not miss doses. YOU HAVE A SCHEDULED APPOINTMENT TODAY WITH DR. OCASIO AT 11:45 AM. Dr. Ocasio will evaluate you for a device called a Cardio Mems. This device can be put in your pulmonary artery by angiogram and measure your fluid status from home so we can better control your heart failure without you going to the hospital.  Secondary Diagnosis:	HTN (hypertension)  Goal:	You have a history of high blood pressure and you must continue medications at home.  Instructions for follow-up, activity and diet:	Please continue Coreg 6.25 mg orally twice daily, Hydralazine 20 mg orally every 8 hours, spironolactone 25 mg orally daily, isosorbide dinitrate 20 mg orally every 8 hours.  Secondary Diagnosis:	HLD (hyperlipidemia)  Goal:	You have a history of elevated cholesterol, please continue Lipitor 80 mg orally daily for plaque build up prevention in the coronary arteries.  Secondary Diagnosis:	Left ventricular mural thrombus without MI  Goal:	You were possibly being treated for a blood clot in the heart. We checked your echocardiogram and there was no evidence of clot. You do not need to be on anticoagulation for that.  Instructions for follow-up, activity and diet:	You should follow up with your cardiologist for a stress test / cardiac catheterization in the near future to assess your coronary arteries. Please continue Aspirin 81 mg orally daily.

## 2017-08-02 NOTE — DISCHARGE NOTE ADULT - PATIENT PORTAL LINK FT
“You can access the [employer] Patient Portal, offered by [facility], by registering with the following website: [email or web address]”

## 2017-08-02 NOTE — DISCHARGE NOTE ADULT - PROVIDER TOKENS
FREE:[LAST:[Ninfa],FIRST:[Sam],PHONE:[phone]],FAX:[(   )    -],ADDRESS:[CARDIOLOGIST]],FREE:[LAST:[Karin],FIRST:[Ga],PHONE:[phone]:[(   [phone]    -],ADDRESS:[PRIMARY CARE DOCTOR]]

## 2017-08-02 NOTE — PROGRESS NOTE ADULT - PROBLEM SELECTOR PLAN 2
- echo with no evidence of LV thrombus  - no AC needed at this time  - ASA/Plavix/SC Hep  - trop neg x2, CP free

## 2017-08-02 NOTE — PROGRESS NOTE ADULT - PROBLEM SELECTOR PLAN 1
Nonischemic cardiomyopathy (HF consult)  - BNP 600s, Lasix 40 mg orally daily, Hydralazine 20 mg orally q8h, spironolactone 25 mg orally daily, isordil 20 mg orally q8h, coreg 6.25 mg orally q12h  - Pt's outpatient cardiologist contacted, reported an echo 2016 revealing severely reduced EF of 15%, severe hypokinesis of LV  - Cath obtained from [facility] 2/14/13 revealing nonobstructive CAD (mild LAD changes, minor changes in remainder of the coronaries, LV gram not performed, mod pulm HTN, markedly depressed cardiac index)  - Echo 8/02 with definity revealing severe LVH, no LV thrombus visualized, severe global hypokinesis of LV, LVEF 25-30%, impaired LV relaxation, pacemaker wire in right heart, mild AR, PAsp 32mmHg, no pericardial effusion  - strict I&Os, daily weights  - after discussion and contact with patient's outpatient cardio Dr. Sam Ocasio [phone], patient will be d/natalie in AM 8/03, will follow up on scheduled appointment at 11:45 AM. And MD informed that patient would be candidate for cardio mems device, and he will discuss/further work up at [facility].  - ?need for ASA / Plavix on d/c. Patient to undergo ischemic w/u as outpatient. Nonischemic cardiomyopathy (HF consult)  - BNP 600s, Lasix 40 mg orally daily, Hydralazine 20 mg orally q8h, spironolactone 25 mg orally daily, isordil 20 mg orally q8h, coreg 6.25 mg orally q12h, holding ACEI  - Pt's outpatient cardiologist contacted, reported an echo 2016 revealing severely reduced EF of 15%, severe hypokinesis of LV  - Cath obtained from [facility] 2/14/13 revealing nonobstructive CAD (mild LAD changes, minor changes in remainder of the coronaries, LV gram not performed, mod pulm HTN, markedly depressed cardiac index)  - Echo 8/02 with definity revealing severe LVH, no LV thrombus visualized, severe global hypokinesis of LV, LVEF 25-30%, impaired LV relaxation, pacemaker wire in right heart, mild AR, PAsp 32mmHg, no pericardial effusion  - strict I&Os, daily weights  - after discussion and contact with patient's outpatient cardio Dr. Sam Ocasio [phone], patient will be d/natalie in AM 8/03, will follow up on scheduled appointment at 11:45 AM. And MD informed that patient would be candidate for cardio mems device, and he will discuss/further work up at [facility].  - ?need for ASA / Plavix on d/c. Patient to undergo ischemic w/u as outpatient.

## 2017-08-02 NOTE — PROGRESS NOTE ADULT - ASSESSMENT
72 yo M PMHx HTN, HLD, nonischemic cardiomyopathy, chronic systolic CHF (EF in 2016 15%) s/p biV ICD, CKD stage 3, ?LV thrombus on coumadin (self d/natalie), presented to [facility] ED after recent d/c from [place] with MUSTAFA, suspected to be in acute on chronic systolic CHF exacerbation in the setting of ?medication compliance.

## 2017-08-03 VITALS
HEART RATE: 86 BPM | DIASTOLIC BLOOD PRESSURE: 82 MMHG | SYSTOLIC BLOOD PRESSURE: 134 MMHG | OXYGEN SATURATION: 98 % | RESPIRATION RATE: 16 BRPM

## 2017-08-03 LAB
ANION GAP SERPL CALC-SCNC: 14 MMOL/L — SIGNIFICANT CHANGE UP (ref 5–17)
BUN SERPL-MCNC: 42 MG/DL — HIGH (ref 7–23)
CALCIUM SERPL-MCNC: 9.1 MG/DL — SIGNIFICANT CHANGE UP (ref 8.4–10.5)
CHLORIDE SERPL-SCNC: 102 MMOL/L — SIGNIFICANT CHANGE UP (ref 96–108)
CO2 SERPL-SCNC: 22 MMOL/L — SIGNIFICANT CHANGE UP (ref 22–31)
CREAT SERPL-MCNC: 1.7 MG/DL — HIGH (ref 0.5–1.3)
GLUCOSE SERPL-MCNC: 94 MG/DL — SIGNIFICANT CHANGE UP (ref 70–99)
HCT VFR BLD CALC: 31.4 % — LOW (ref 39–50)
HGB BLD-MCNC: 10.3 G/DL — LOW (ref 13–17)
MAGNESIUM SERPL-MCNC: 2.1 MG/DL — SIGNIFICANT CHANGE UP (ref 1.6–2.6)
MCHC RBC-ENTMCNC: 28.4 PG — SIGNIFICANT CHANGE UP (ref 27–34)
MCHC RBC-ENTMCNC: 32.8 G/DL — SIGNIFICANT CHANGE UP (ref 32–36)
MCV RBC AUTO: 86.5 FL — SIGNIFICANT CHANGE UP (ref 80–100)
PLATELET # BLD AUTO: 208 K/UL — SIGNIFICANT CHANGE UP (ref 150–400)
POTASSIUM SERPL-MCNC: 4.6 MMOL/L — SIGNIFICANT CHANGE UP (ref 3.5–5.3)
POTASSIUM SERPL-SCNC: 4.6 MMOL/L — SIGNIFICANT CHANGE UP (ref 3.5–5.3)
RBC # BLD: 3.63 M/UL — LOW (ref 4.2–5.8)
RBC # FLD: 15.8 % — SIGNIFICANT CHANGE UP (ref 10.3–16.9)
SODIUM SERPL-SCNC: 138 MMOL/L — SIGNIFICANT CHANGE UP (ref 135–145)
WBC # BLD: 4.8 K/UL — SIGNIFICANT CHANGE UP (ref 3.8–10.5)
WBC # FLD AUTO: 4.8 K/UL — SIGNIFICANT CHANGE UP (ref 3.8–10.5)

## 2017-08-03 PROCEDURE — 82553 CREATINE MB FRACTION: CPT

## 2017-08-03 PROCEDURE — 36415 COLL VENOUS BLD VENIPUNCTURE: CPT

## 2017-08-03 PROCEDURE — 84100 ASSAY OF PHOSPHORUS: CPT

## 2017-08-03 PROCEDURE — 99285 EMERGENCY DEPT VISIT HI MDM: CPT | Mod: 25

## 2017-08-03 PROCEDURE — 85027 COMPLETE CBC AUTOMATED: CPT

## 2017-08-03 PROCEDURE — G0378: CPT

## 2017-08-03 PROCEDURE — 80048 BASIC METABOLIC PNL TOTAL CA: CPT

## 2017-08-03 PROCEDURE — 82550 ASSAY OF CK (CPK): CPT

## 2017-08-03 PROCEDURE — 85730 THROMBOPLASTIN TIME PARTIAL: CPT

## 2017-08-03 PROCEDURE — 84484 ASSAY OF TROPONIN QUANT: CPT

## 2017-08-03 PROCEDURE — 93005 ELECTROCARDIOGRAM TRACING: CPT

## 2017-08-03 PROCEDURE — 83880 ASSAY OF NATRIURETIC PEPTIDE: CPT

## 2017-08-03 PROCEDURE — 85025 COMPLETE CBC W/AUTO DIFF WBC: CPT

## 2017-08-03 PROCEDURE — 80053 COMPREHEN METABOLIC PANEL: CPT

## 2017-08-03 PROCEDURE — 80061 LIPID PANEL: CPT

## 2017-08-03 PROCEDURE — 71045 X-RAY EXAM CHEST 1 VIEW: CPT

## 2017-08-03 PROCEDURE — C8929: CPT

## 2017-08-03 PROCEDURE — 83036 HEMOGLOBIN GLYCOSYLATED A1C: CPT

## 2017-08-03 PROCEDURE — 99238 HOSP IP/OBS DSCHRG MGMT 30/<: CPT

## 2017-08-03 PROCEDURE — 81003 URINALYSIS AUTO W/O SCOPE: CPT

## 2017-08-03 PROCEDURE — 83735 ASSAY OF MAGNESIUM: CPT

## 2017-08-03 PROCEDURE — 85610 PROTHROMBIN TIME: CPT

## 2017-08-03 RX ORDER — LISINOPRIL 2.5 MG/1
1 TABLET ORAL
Qty: 0 | Refills: 0 | COMMUNITY

## 2017-08-03 RX ORDER — FUROSEMIDE 40 MG
1 TABLET ORAL
Qty: 0 | Refills: 0 | COMMUNITY

## 2017-08-03 RX ORDER — ATORVASTATIN CALCIUM 80 MG/1
1 TABLET, FILM COATED ORAL
Qty: 30 | Refills: 3 | OUTPATIENT
Start: 2017-08-03 | End: 2017-11-30

## 2017-08-03 RX ORDER — ISOSORBIDE DINITRATE 5 MG/1
1 TABLET ORAL
Qty: 0 | Refills: 0 | COMMUNITY

## 2017-08-03 RX ORDER — SPIRONOLACTONE 25 MG/1
1 TABLET, FILM COATED ORAL
Qty: 30 | Refills: 3 | OUTPATIENT
Start: 2017-08-03 | End: 2017-11-30

## 2017-08-03 RX ORDER — CARVEDILOL PHOSPHATE 80 MG/1
1 CAPSULE, EXTENDED RELEASE ORAL
Qty: 60 | Refills: 3 | OUTPATIENT
Start: 2017-08-03 | End: 2017-11-30

## 2017-08-03 RX ORDER — CARVEDILOL PHOSPHATE 80 MG/1
1 CAPSULE, EXTENDED RELEASE ORAL
Qty: 0 | Refills: 0 | COMMUNITY

## 2017-08-03 RX ORDER — HYDRALAZINE HCL 50 MG
2 TABLET ORAL
Qty: 0 | Refills: 0 | COMMUNITY

## 2017-08-03 RX ORDER — ISOSORBIDE DINITRATE 5 MG/1
1 TABLET ORAL
Qty: 90 | Refills: 3 | OUTPATIENT
Start: 2017-08-03 | End: 2017-11-30

## 2017-08-03 RX ORDER — ASPIRIN/CALCIUM CARB/MAGNESIUM 324 MG
1 TABLET ORAL
Qty: 30 | Refills: 3 | OUTPATIENT
Start: 2017-08-03 | End: 2017-11-30

## 2017-08-03 RX ORDER — FUROSEMIDE 40 MG
1 TABLET ORAL
Qty: 30 | Refills: 3 | OUTPATIENT
Start: 2017-08-03 | End: 2017-11-30

## 2017-08-03 RX ORDER — HYDRALAZINE HCL 50 MG
2 TABLET ORAL
Qty: 180 | Refills: 3 | OUTPATIENT
Start: 2017-08-03 | End: 2017-11-30

## 2017-08-03 RX ORDER — ASPIRIN/CALCIUM CARB/MAGNESIUM 324 MG
1 TABLET ORAL
Qty: 0 | Refills: 0 | COMMUNITY

## 2017-08-03 RX ORDER — SPIRONOLACTONE 25 MG/1
1 TABLET, FILM COATED ORAL
Qty: 0 | Refills: 0 | COMMUNITY

## 2017-08-03 RX ADMIN — CARVEDILOL PHOSPHATE 6.25 MILLIGRAM(S): 80 CAPSULE, EXTENDED RELEASE ORAL at 05:50

## 2017-08-03 RX ADMIN — SPIRONOLACTONE 25 MILLIGRAM(S): 25 TABLET, FILM COATED ORAL at 05:50

## 2017-08-03 RX ADMIN — Medication 20 MILLIGRAM(S): at 05:50

## 2017-08-03 RX ADMIN — PANTOPRAZOLE SODIUM 40 MILLIGRAM(S): 20 TABLET, DELAYED RELEASE ORAL at 07:06

## 2017-08-03 RX ADMIN — HEPARIN SODIUM 5000 UNIT(S): 5000 INJECTION INTRAVENOUS; SUBCUTANEOUS at 05:49

## 2017-08-03 RX ADMIN — ISOSORBIDE DINITRATE 20 MILLIGRAM(S): 5 TABLET ORAL at 05:50

## 2017-08-03 RX ADMIN — Medication 40 MILLIGRAM(S): at 05:50

## 2017-08-08 DIAGNOSIS — I13.0 HYPERTENSIVE HEART AND CHRONIC KIDNEY DISEASE WITH HEART FAILURE AND STAGE 1 THROUGH STAGE 4 CHRONIC KIDNEY DISEASE, OR UNSPECIFIED CHRONIC KIDNEY DISEASE: ICD-10-CM

## 2017-08-08 DIAGNOSIS — Z96.642 PRESENCE OF LEFT ARTIFICIAL HIP JOINT: ICD-10-CM

## 2017-08-08 DIAGNOSIS — I34.0 NONRHEUMATIC MITRAL (VALVE) INSUFFICIENCY: ICD-10-CM

## 2017-08-08 DIAGNOSIS — I35.1 NONRHEUMATIC AORTIC (VALVE) INSUFFICIENCY: ICD-10-CM

## 2017-08-08 DIAGNOSIS — Z87.891 PERSONAL HISTORY OF NICOTINE DEPENDENCE: ICD-10-CM

## 2017-08-08 DIAGNOSIS — N18.3 CHRONIC KIDNEY DISEASE, STAGE 3 (MODERATE): ICD-10-CM

## 2017-08-08 DIAGNOSIS — E78.5 HYPERLIPIDEMIA, UNSPECIFIED: ICD-10-CM

## 2017-08-08 DIAGNOSIS — Z79.82 LONG TERM (CURRENT) USE OF ASPIRIN: ICD-10-CM

## 2017-08-08 DIAGNOSIS — I20.9 ANGINA PECTORIS, UNSPECIFIED: ICD-10-CM

## 2017-08-08 DIAGNOSIS — J44.9 CHRONIC OBSTRUCTIVE PULMONARY DISEASE, UNSPECIFIED: ICD-10-CM

## 2017-08-08 DIAGNOSIS — I50.22 CHRONIC SYSTOLIC (CONGESTIVE) HEART FAILURE: ICD-10-CM

## 2017-08-08 DIAGNOSIS — I42.9 CARDIOMYOPATHY, UNSPECIFIED: ICD-10-CM

## 2017-08-08 DIAGNOSIS — Z95.810 PRESENCE OF AUTOMATIC (IMPLANTABLE) CARDIAC DEFIBRILLATOR: ICD-10-CM

## 2019-06-03 NOTE — DISCHARGE NOTE ADULT - NS MD DC FALL RISK RISK
For information on Fall & Injury Prevention, visit [email or web address] Constitutional: No fevers/chills.    Head: No injury, headache.    Eyes:  No eye pain or discharge. No injury.    ENMT:  No pain, discharge or infections. No neck pain or stiffness. No loss ROM.    Cardiac:  No chest pain, SOB or edema.    Respiratory:  No cough or respiratory distress.     GI:  No nausea, vomiting, diarrhea or abdominal pain. No change in PO intake.    :  No frequency, urgency or burning. Decreased urine output.    MS:  No leg pain, leg swelling, myalgia, muscle weakness, joint pain or back pain. No loss ROM.    Neuro:  No dizziness or weakness.  No LOC.    Skin:  No skin rash.

## 2023-08-01 NOTE — ED ADULT TRIAGE NOTE - AS O2 DELIVERY
Patient c/o N/V/D, right-sided abdominal pain and decreased PO intake x 1 week. Denies fevers, chills, chest pain and SOB. Hx. HTN and DM. supplemental O2

## 2024-04-05 NOTE — H&P ADULT - REASON FOR ADMISSION
Exertional Chest pressure and SOB
PAST MEDICAL HISTORY:  Atrial fibrillation, unspecified type     Benign prostatic hyperplasia, presence of lower urinary tract symptoms unspecified, unspecified morphology     GERD (gastroesophageal reflux disease)     Glaucoma     Herpes virus disease right eye    History of myasthenia gravis 2016 , no weakness, in remission    HLD (hyperlipidemia)     Iron deficiency anemia     Nasal polyp     Osteopenia     Right hip pain avasular necrosis of right femoral head 2018    TIA (transient ischemic attack) 2006